# Patient Record
Sex: MALE | Race: WHITE | Employment: FULL TIME | URBAN - METROPOLITAN AREA
[De-identification: names, ages, dates, MRNs, and addresses within clinical notes are randomized per-mention and may not be internally consistent; named-entity substitution may affect disease eponyms.]

---

## 2021-05-01 ENCOUNTER — APPOINTMENT (OUTPATIENT)
Dept: GENERAL RADIOLOGY | Age: 36
End: 2021-05-01
Attending: PHYSICIAN ASSISTANT

## 2021-05-01 ENCOUNTER — HOSPITAL ENCOUNTER (EMERGENCY)
Age: 36
Discharge: HOME | End: 2021-05-01
Attending: EMERGENCY MEDICINE

## 2021-05-01 VITALS
SYSTOLIC BLOOD PRESSURE: 133 MMHG | HEIGHT: 69 IN | DIASTOLIC BLOOD PRESSURE: 94 MMHG | WEIGHT: 189.6 LBS | BODY MASS INDEX: 28.08 KG/M2 | TEMPERATURE: 98.4 F | HEART RATE: 81 BPM | OXYGEN SATURATION: 99 % | RESPIRATION RATE: 15 BRPM

## 2021-05-01 DIAGNOSIS — D72.829 LEUKOCYTOSIS, UNSPECIFIED TYPE: ICD-10-CM

## 2021-05-01 DIAGNOSIS — M71.161 INFECTED PREPATELLAR BURSA, RIGHT: Primary | ICD-10-CM

## 2021-05-01 LAB
ANION GAP SERPL CALC-SCNC: 3 MMOL/L (ref 5–15)
BASOPHILS # BLD: 0.1 K/UL (ref 0–0.1)
BASOPHILS NFR BLD: 0 % (ref 0–1)
BUN SERPL-MCNC: 13 MG/DL (ref 6–20)
BUN/CREAT SERPL: 14 (ref 12–20)
CALCIUM SERPL-MCNC: 9.4 MG/DL (ref 8.5–10.1)
CHLORIDE SERPL-SCNC: 104 MMOL/L (ref 97–108)
CO2 SERPL-SCNC: 28 MMOL/L (ref 21–32)
COMMENT, HOLDF: NORMAL
CREAT SERPL-MCNC: 0.91 MG/DL (ref 0.7–1.3)
CRP SERPL-MCNC: 0.46 MG/DL (ref 0–0.6)
DIFFERENTIAL METHOD BLD: ABNORMAL
EOSINOPHIL # BLD: 0 K/UL (ref 0–0.4)
EOSINOPHIL NFR BLD: 0 % (ref 0–7)
ERYTHROCYTE [DISTWIDTH] IN BLOOD BY AUTOMATED COUNT: 12.2 % (ref 11.5–14.5)
ERYTHROCYTE [SEDIMENTATION RATE] IN BLOOD: 5 MM/HR (ref 0–15)
GLUCOSE SERPL-MCNC: 120 MG/DL (ref 65–100)
HCT VFR BLD AUTO: 47 % (ref 36.6–50.3)
HGB BLD-MCNC: 15.2 G/DL (ref 12.1–17)
IMM GRANULOCYTES # BLD AUTO: 0.1 K/UL (ref 0–0.04)
IMM GRANULOCYTES NFR BLD AUTO: 0 % (ref 0–0.5)
LYMPHOCYTES # BLD: 1.2 K/UL (ref 0.8–3.5)
LYMPHOCYTES NFR BLD: 8 % (ref 12–49)
MCH RBC QN AUTO: 28.9 PG (ref 26–34)
MCHC RBC AUTO-ENTMCNC: 32.3 G/DL (ref 30–36.5)
MCV RBC AUTO: 89.4 FL (ref 80–99)
MONOCYTES # BLD: 1 K/UL (ref 0–1)
MONOCYTES NFR BLD: 7 % (ref 5–13)
NEUTS SEG # BLD: 13 K/UL (ref 1.8–8)
NEUTS SEG NFR BLD: 85 % (ref 32–75)
NRBC # BLD: 0 K/UL (ref 0–0.01)
NRBC BLD-RTO: 0 PER 100 WBC
PLATELET # BLD AUTO: 307 K/UL (ref 150–400)
PMV BLD AUTO: 10.5 FL (ref 8.9–12.9)
POTASSIUM SERPL-SCNC: 3.9 MMOL/L (ref 3.5–5.1)
RBC # BLD AUTO: 5.26 M/UL (ref 4.1–5.7)
SAMPLES BEING HELD,HOLD: NORMAL
SODIUM SERPL-SCNC: 135 MMOL/L (ref 136–145)
URATE SERPL-MCNC: 6.6 MG/DL (ref 3.5–7.2)
WBC # BLD AUTO: 15.4 K/UL (ref 4.1–11.1)

## 2021-05-01 PROCEDURE — 73562 X-RAY EXAM OF KNEE 3: CPT

## 2021-05-01 PROCEDURE — 74011250637 HC RX REV CODE- 250/637: Performed by: PHYSICIAN ASSISTANT

## 2021-05-01 PROCEDURE — 74011000250 HC RX REV CODE- 250: Performed by: PHYSICIAN ASSISTANT

## 2021-05-01 PROCEDURE — 85652 RBC SED RATE AUTOMATED: CPT

## 2021-05-01 PROCEDURE — 74011250636 HC RX REV CODE- 250/636: Performed by: PHYSICIAN ASSISTANT

## 2021-05-01 PROCEDURE — 99283 EMERGENCY DEPT VISIT LOW MDM: CPT

## 2021-05-01 PROCEDURE — 84550 ASSAY OF BLOOD/URIC ACID: CPT

## 2021-05-01 PROCEDURE — 96374 THER/PROPH/DIAG INJ IV PUSH: CPT

## 2021-05-01 PROCEDURE — 36415 COLL VENOUS BLD VENIPUNCTURE: CPT

## 2021-05-01 PROCEDURE — 80048 BASIC METABOLIC PNL TOTAL CA: CPT

## 2021-05-01 PROCEDURE — 85025 COMPLETE CBC W/AUTO DIFF WBC: CPT

## 2021-05-01 PROCEDURE — 86140 C-REACTIVE PROTEIN: CPT

## 2021-05-01 RX ORDER — LIDOCAINE HYDROCHLORIDE 10 MG/ML
5 INJECTION INFILTRATION; PERINEURAL ONCE
Status: COMPLETED | OUTPATIENT
Start: 2021-05-01 | End: 2021-05-01

## 2021-05-01 RX ORDER — KETOROLAC TROMETHAMINE 30 MG/ML
30 INJECTION, SOLUTION INTRAMUSCULAR; INTRAVENOUS
Status: COMPLETED | OUTPATIENT
Start: 2021-05-01 | End: 2021-05-01

## 2021-05-01 RX ORDER — IBUPROFEN 800 MG/1
800 TABLET ORAL
Qty: 20 TAB | Refills: 0 | Status: SHIPPED | OUTPATIENT
Start: 2021-05-01

## 2021-05-01 RX ORDER — CEPHALEXIN 500 MG/1
500 CAPSULE ORAL 4 TIMES DAILY
Qty: 28 CAP | Refills: 0 | Status: SHIPPED | OUTPATIENT
Start: 2021-05-01 | End: 2021-05-08

## 2021-05-01 RX ORDER — CEPHALEXIN 500 MG/1
500 CAPSULE ORAL
Status: COMPLETED | OUTPATIENT
Start: 2021-05-01 | End: 2021-05-01

## 2021-05-01 RX ADMIN — KETOROLAC TROMETHAMINE 30 MG: 30 INJECTION, SOLUTION INTRAMUSCULAR; INTRAVENOUS at 11:08

## 2021-05-01 RX ADMIN — LIDOCAINE HYDROCHLORIDE 5 ML: 10 INJECTION, SOLUTION INFILTRATION; PERINEURAL at 13:02

## 2021-05-01 RX ADMIN — CEPHALEXIN 500 MG: 500 CAPSULE ORAL at 14:25

## 2021-05-01 NOTE — ED PROVIDER NOTES
24-year-old male, Pashto-speaking here with friend who is interpreting at his request, presents ambulatory for evaluation of left anterior knee pain and swelling for the past 2 days. He woke up Thursday morning with pain, swelling and warmth to the anterior infrapatellar region of his knee, slowly worsening in pain. He denies posterior pain, calf pain, leg swelling, shortness of breath, chest pain. He denies history of similar symptoms. He denies recent injury to the leg or knee, does not work on his [employer]. He has pain with movement of his knee. He denies personal or family history of gout or other medical problems. He specifically denies fever, chills, vomiting, diarrhea, leg swelling, numbness, tingling or weakness. History reviewed. No pertinent past medical history. Past Surgical History:   Procedure Laterality Date    HX ORTHOPAEDIC      right femur         History reviewed. No pertinent family history.     Social History     Socioeconomic History    Marital status: SINGLE     Spouse name: Not on file    Number of children: Not on file    Years of education: Not on file    Highest education level: Not on file   Occupational History    Not on file   Social Needs    Financial resource strain: Not on file    Food insecurity     Worry: Not on file     Inability: Not on file    Transportation needs     Medical: Not on file     Non-medical: Not on file   Tobacco Use    Smoking status: Never Smoker    Smokeless tobacco: Never Used   Substance and Sexual Activity    Alcohol use: Never     Frequency: Never    Drug use: Never    Sexual activity: Not on file   Lifestyle    Physical activity     Days per week: Not on file     Minutes per session: Not on file    Stress: Not on file   Relationships    Social connections     Talks on phone: Not on file     Gets together: Not on file     Attends Jainism service: Not on file     Active member of club or organization: Not on file     Attends meetings of clubs or organizations: Not on file     Relationship status: Not on file    Intimate partner violence     Fear of current or ex partner: Not on file     Emotionally abused: Not on file     Physically abused: Not on file     Forced sexual activity: Not on file   Other Topics Concern    Not on file   Social History Narrative    Not on file         ALLERGIES: Patient has no known allergies. Review of Systems   Constitutional: Negative. Negative for activity change, chills, fatigue and unexpected weight change. HENT: Negative for trouble swallowing. Respiratory: Negative for cough, chest tightness, shortness of breath and wheezing. Cardiovascular: Negative. Negative for chest pain and palpitations. Gastrointestinal: Negative. Negative for abdominal pain, diarrhea, nausea and vomiting. Genitourinary: Negative. Negative for dysuria, flank pain, frequency and hematuria. Musculoskeletal: Positive for arthralgias and gait problem. Negative for back pain, neck pain and neck stiffness. Skin: Negative. Negative for color change and rash. Neurological: Negative for dizziness, numbness and headaches. All other systems reviewed and are negative. Vitals:    05/01/21 0826   BP: (!) 133/94   Pulse: 81   Resp: 15   Temp: 98.4 °F (36.9 °C)   SpO2: 99%   Weight: 86 kg (189 lb 9.5 oz)   Height: 5' 9\" (1.753 m)            Physical Exam  Vitals signs and nursing note reviewed. Constitutional:       Appearance: Normal appearance. Neck:      Musculoskeletal: Normal range of motion. Cardiovascular:      Rate and Rhythm: Normal rate. Pulses: Normal pulses. Pulmonary:      Effort: Pulmonary effort is normal.   Musculoskeletal:      Right lower leg: No edema. Left lower leg: No edema. Comments: Left knee-  No joint effusion. TTP of infrapatellar bursa with localized warmth and mild soft tissue swelling. No erythema. Pain with full movement of joint. F ROM of ankle and hip. NVI throughout. DP and PT pulses 2+. No calf TTP, no LE edema. No pitting edema or palpable cords. Skin:     General: Skin is warm. Capillary Refill: Capillary refill takes less than 2 seconds. Neurological:      General: No focal deficit present. Mental Status: He is alert. MDM  Number of Diagnoses or Management Options  Diagnosis management comments:   Ddx: bursitis, joint effusion       Amount and/or Complexity of Data Reviewed  Clinical lab tests: ordered and reviewed  Tests in the radiology section of CPT®: ordered and reviewed  Review and summarize past medical records: yes  Discuss the patient with other providers: yes    Patient Progress  Patient progress: stable         Procedures    I discussed patient's PMH, exam findings as well as careplan with the ER attending who agrees with care plan. Marnie Vo PA-C    CONSULT NOTE:   11:56 AM  Marnie Vo PA-C spoke with Dr. Randolph Pride / OPAL Weinstein  Specialty: ortho  Discussed pt's hx, disposition, and available diagnostic and imaging results. Reviewed care plans. Written by Marnie Vo PA-C. OPAL Weinstein saw and examined patient, see his note for further. Recommends discharge patient on Keflex, ibuprofen and follow-up with Dr. Dylan Fajardo Monday. Ace wrap and crutches also recommended. DISCHARGE NOTE:  2:07 PM  The patient has been re-evaluated and feeling much better and are stable for discharge. All available radiology and laboratory results have been reviewed with patient and/or available family. Patient and/or family verbally conveyed their understanding and agreement of the patient's signs, symptoms, diagnosis, treatment and prognosis and additionally agree to follow-up as recommended in the discharge instructions or to return to the Emergency Department should their condition change or worsen prior to their follow-up appointment.   All questions have been answered and patient and/or available family express understanding. LABORATORY RESULTS:  Recent Results (from the past 24 hour(s))   SAMPLES BEING HELD    Collection Time: 05/01/21 10:27 AM   Result Value Ref Range    SAMPLES BEING HELD 1red,1blu     COMMENT        Add-on orders for these samples will be processed based on acceptable specimen integrity and analyte stability, which may vary by analyte. CBC WITH AUTOMATED DIFF    Collection Time: 05/01/21 10:27 AM   Result Value Ref Range    WBC 15.4 (H) 4.1 - 11.1 K/uL    RBC 5.26 4.10 - 5.70 M/uL    HGB 15.2 12.1 - 17.0 g/dL    HCT 47.0 36.6 - 50.3 %    MCV 89.4 80.0 - 99.0 FL    MCH 28.9 26.0 - 34.0 PG    MCHC 32.3 30.0 - 36.5 g/dL    RDW 12.2 11.5 - 14.5 %    PLATELET 143 581 - 649 K/uL    MPV 10.5 8.9 - 12.9 FL    NRBC 0.0 0  WBC    ABSOLUTE NRBC 0.00 0.00 - 0.01 K/uL    NEUTROPHILS 85 (H) 32 - 75 %    LYMPHOCYTES 8 (L) 12 - 49 %    MONOCYTES 7 5 - 13 %    EOSINOPHILS 0 0 - 7 %    BASOPHILS 0 0 - 1 %    IMMATURE GRANULOCYTES 0 0.0 - 0.5 %    ABS. NEUTROPHILS 13.0 (H) 1.8 - 8.0 K/UL    ABS. LYMPHOCYTES 1.2 0.8 - 3.5 K/UL    ABS. MONOCYTES 1.0 0.0 - 1.0 K/UL    ABS. EOSINOPHILS 0.0 0.0 - 0.4 K/UL    ABS. BASOPHILS 0.1 0.0 - 0.1 K/UL    ABS. IMM.  GRANS. 0.1 (H) 0.00 - 0.04 K/UL    DF AUTOMATED     METABOLIC PANEL, BASIC    Collection Time: 05/01/21 10:27 AM   Result Value Ref Range    Sodium 135 (L) 136 - 145 mmol/L    Potassium 3.9 3.5 - 5.1 mmol/L    Chloride 104 97 - 108 mmol/L    CO2 28 21 - 32 mmol/L    Anion gap 3 (L) 5 - 15 mmol/L    Glucose 120 (H) 65 - 100 mg/dL    BUN 13 6 - 20 MG/DL    Creatinine 0.91 0.70 - 1.30 MG/DL    BUN/Creatinine ratio 14 12 - 20      GFR est AA >60 >60 ml/min/1.73m2    GFR est non-AA >60 >60 ml/min/1.73m2    Calcium 9.4 8.5 - 10.1 MG/DL   URIC ACID    Collection Time: 05/01/21 10:27 AM   Result Value Ref Range    Uric acid 6.6 3.5 - 7.2 MG/DL   SED RATE (ESR)    Collection Time: 05/01/21 10:27 AM   Result Value Ref Range    Sed rate, automated 5 0 - 15 mm/hr   C REACTIVE PROTEIN, QT    Collection Time: 05/01/21 10:27 AM   Result Value Ref Range    C-Reactive protein 0.46 0.00 - 0.60 mg/dL       IMAGING RESULTS:  Xr Knee Lt 3 V    Result Date: 5/1/2021  Soft tissue swelling anterior to the knee. [place] [facility] MEDICATIONS GIVEN:  Medications   cephALEXin (KEFLEX) capsule 500 mg (has no administration in time range)   ketorolac (TORADOL) injection 30 mg (30 mg IntraVENous Given 5/1/21 1108)   lidocaine (XYLOCAINE) 10 mg/mL (1 %) injection 5 mL (5 mL SubCUTAneous Given by Provider 5/1/21 1302)       IMPRESSION:  1. Infected prepatellar bursa, right    2. Leukocytosis, unspecified type        PLAN:  Follow-up Information     Follow up With Specialties Details Why Contact Deana Hidalgo, DO Orthopedic Surgery Schedule an appointment as soon as possible for a visit  for follow-up. [address] [place] [address] 1 [address] [place] [address] [phone]  [phone]          There are no discharge medications for this patient.

## 2021-05-01 NOTE — ED NOTES
Patient given discharge paperwork and instructions by RN and provider. Patient verbalized understanding. No signs of distress at time of discharge. Patient ambulatory out of ER with steady gait on crutches.

## 2021-05-01 NOTE — ED TRIAGE NOTES
Pt ambulatory to ED accompanied by friend with c/o left knee pain and swelling onset 2 days ago. Denies SOB or CP. Pt reports flight to [place] last week.

## 2021-05-01 NOTE — CONSULTS
ORTHO CONSULT NOTE    Date of Consultation:  May 1, 2021  Referring Physician:  Connie JOSEPH  CC: L knee pain    HPI:  Saqib Lawrence is a 43 y.o. male c/o L knee pain x 2 days; no inciting event reported, denies trauma, overuse, IVDU, fever, chills; Pain local to anterior knee, just over top the patella, worse with bending, 7/10 with palpation, assoc redness. History reviewed. No pertinent past medical history. Past Surgical History:   Procedure Laterality Date    HX ORTHOPAEDIC      right femur      History reviewed. No pertinent family history. Social History     Tobacco Use    Smoking status: Never Smoker    Smokeless tobacco: Never Used   Substance Use Topics    Alcohol use: Never     Frequency: Never     No Known Allergies     Review of Systems:  Per HPI. Objective:     Patient Vitals for the past 8 hrs:   BP Temp Pulse Resp SpO2 Height Weight   21 0826 (!) 133/94 98.4 °F (36.9 °C) 81 15 99 % 5' 9\" (1.753 m) 86 kg (189 lb 9.5 oz)     Temp (24hrs), Av.4 °F (36.9 °C), Min:98.4 °F (36.9 °C), Max:98.4 °F (36.9 °C)      EXAM:   L knee with mild swelling and erythema anterior to patella; ttp over patella, tendon intact, knee extension intact; remainder of knee unremarkable, stable; DP 2+, SILT, DF/PF intact. No posterior calf ttp. Imaging Review:   Results from Hospital Encounter encounter on 21   XR KNEE LT 3 V    Narrative EXAM: XR KNEE LT 3 V    INDICATION: pain, swelling. COMPARISON: None. FINDINGS: Three views of the left knee demonstrate no fracture or other acute  osseous or articular abnormality. There is no effusion. There is soft tissue  swelling anterior to the knee. Impression Soft tissue swelling anterior to the knee. .     No results found for this or any previous visit.     Labs:   Recent Results (from the past 24 hour(s))   SAMPLES BEING HELD    Collection Time: 21 10:27 AM   Result Value Ref Range    SAMPLES BEING HELD 1red,1blu     COMMENT Add-on orders for these samples will be processed based on acceptable specimen integrity and analyte stability, which may vary by analyte. CBC WITH AUTOMATED DIFF    Collection Time: 05/01/21 10:27 AM   Result Value Ref Range    WBC 15.4 (H) 4.1 - 11.1 K/uL    RBC 5.26 4.10 - 5.70 M/uL    HGB 15.2 12.1 - 17.0 g/dL    HCT 47.0 36.6 - 50.3 %    MCV 89.4 80.0 - 99.0 FL    MCH 28.9 26.0 - 34.0 PG    MCHC 32.3 30.0 - 36.5 g/dL    RDW 12.2 11.5 - 14.5 %    PLATELET 574 182 - 534 K/uL    MPV 10.5 8.9 - 12.9 FL    NRBC 0.0 0  WBC    ABSOLUTE NRBC 0.00 0.00 - 0.01 K/uL    NEUTROPHILS 85 (H) 32 - 75 %    LYMPHOCYTES 8 (L) 12 - 49 %    MONOCYTES 7 5 - 13 %    EOSINOPHILS 0 0 - 7 %    BASOPHILS 0 0 - 1 %    IMMATURE GRANULOCYTES 0 0.0 - 0.5 %    ABS. NEUTROPHILS 13.0 (H) 1.8 - 8.0 K/UL    ABS. LYMPHOCYTES 1.2 0.8 - 3.5 K/UL    ABS. MONOCYTES 1.0 0.0 - 1.0 K/UL    ABS. EOSINOPHILS 0.0 0.0 - 0.4 K/UL    ABS. BASOPHILS 0.1 0.0 - 0.1 K/UL    ABS. IMM.  GRANS. 0.1 (H) 0.00 - 0.04 K/UL    DF AUTOMATED     METABOLIC PANEL, BASIC    Collection Time: 05/01/21 10:27 AM   Result Value Ref Range    Sodium 135 (L) 136 - 145 mmol/L    Potassium 3.9 3.5 - 5.1 mmol/L    Chloride 104 97 - 108 mmol/L    CO2 28 21 - 32 mmol/L    Anion gap 3 (L) 5 - 15 mmol/L    Glucose 120 (H) 65 - 100 mg/dL    BUN 13 6 - 20 MG/DL    Creatinine 0.91 0.70 - 1.30 MG/DL    BUN/Creatinine ratio 14 12 - 20      GFR est AA >60 >60 ml/min/1.73m2    GFR est non-AA >60 >60 ml/min/1.73m2    Calcium 9.4 8.5 - 10.1 MG/DL   URIC ACID    Collection Time: 05/01/21 10:27 AM   Result Value Ref Range    Uric acid 6.6 3.5 - 7.2 MG/DL   SED RATE (ESR)    Collection Time: 05/01/21 10:27 AM   Result Value Ref Range    Sed rate, automated 5 0 - 15 mm/hr   C REACTIVE PROTEIN, QT    Collection Time: 05/01/21 10:27 AM   Result Value Ref Range    C-Reactive protein 0.46 0.00 - 0.60 mg/dL       Impression:   Prepatellar bursitis    Plan:   Bedside ultrasound used to evaluate for fluid, small pocket seen over area of ttp; aspiration attempted and confirmed proper placement, but no fluid could be yielded; pt verbally consented prior to procedure. Will treat as infected; Home with oral abx, ice, elevated, Ace wrap, crutches. F/U with Dr. Luther Sanches, call for appt on Monday. Return precautions discussed. Dr. Brianne Peters is aware and agrees with above plan.       OPAL Freed  [facility]  [address]

## 2023-05-15 RX ORDER — IBUPROFEN 800 MG/1
800 TABLET ORAL EVERY 8 HOURS PRN
COMMUNITY
Start: 2021-05-01